# Patient Record
Sex: MALE | Employment: OTHER | URBAN - METROPOLITAN AREA
[De-identification: names, ages, dates, MRNs, and addresses within clinical notes are randomized per-mention and may not be internally consistent; named-entity substitution may affect disease eponyms.]

---

## 2023-03-02 ENCOUNTER — TRANSCRIBE ORDER (OUTPATIENT)
Dept: SCHEDULING | Age: 53
End: 2023-03-02

## 2023-03-02 DIAGNOSIS — Z00.00 PREVENTATIVE HEALTH CARE: Primary | ICD-10-CM

## 2023-03-14 ENCOUNTER — HOSPITAL ENCOUNTER (OUTPATIENT)
Dept: CT IMAGING | Age: 53
Discharge: HOME | End: 2023-03-14
Attending: INTERNAL MEDICINE
Payer: SELF-PAY

## 2023-03-14 DIAGNOSIS — Z00.00 PREVENTATIVE HEALTH CARE: ICD-10-CM

## 2023-03-14 PROCEDURE — 75571 CT HRT W/O DYE W/CA TEST: CPT

## 2024-05-20 ENCOUNTER — OFFICE VISIT (OUTPATIENT)
Age: 54
End: 2024-05-20
Payer: COMMERCIAL

## 2024-05-20 VITALS
HEIGHT: 70 IN | OXYGEN SATURATION: 96 % | SYSTOLIC BLOOD PRESSURE: 132 MMHG | HEART RATE: 97 BPM | WEIGHT: 185 LBS | DIASTOLIC BLOOD PRESSURE: 72 MMHG | BODY MASS INDEX: 26.48 KG/M2

## 2024-05-20 DIAGNOSIS — R25.1 TREMOR OF BOTH HANDS: Primary | ICD-10-CM

## 2024-05-20 PROCEDURE — 99204 OFFICE O/P NEW MOD 45 MIN: CPT | Performed by: PSYCHIATRY & NEUROLOGY

## 2024-05-20 RX ORDER — LISINOPRIL 10 MG/1
10 TABLET ORAL DAILY
COMMUNITY
Start: 2024-05-16

## 2024-05-20 RX ORDER — LEVOTHYROXINE SODIUM 112 UG/1
112 TABLET ORAL DAILY
COMMUNITY
Start: 2024-04-16

## 2024-05-20 RX ORDER — ATORVASTATIN CALCIUM 10 MG/1
10 TABLET, FILM COATED ORAL DAILY
COMMUNITY
Start: 2024-05-16

## 2024-05-20 NOTE — PROGRESS NOTES
NEUROLOGY  NEW PATIENT EVALUATION/CONSULTATION       PATIENT NAME: Franky Worley    MRN: [id number]    REASON FOR CONSULTATION: Tremor    05/20/24      Previous records (physician notes, laboratory reports, and radiology reports) and imaging studies were reviewed and summarized. My recommendations will be communicated back to the patient's physician(s) via electronic medical record and/or by US mail.       HISTORY OF PRESENT ILLNESS:  Franky Worley is a 54 y.o. right handed male presenting for evaluation of tremors which started Fall 2023, primarily noticed by his wife. Around that same time, his Endocrinologist noticed that his thyroid levels were elevated due to excessive thyroid supplementation. His wife has not noted a significant change in tremors with dose reduction. They have also noticed periodic limb movements during sleep.     Motor:   Tremor-b/l hands at rest, not with activity  Walking/Falls-walking is stable  Micrographia-uncertain  Freezing/Bradykinesia-none  Balance-no issues  Non-motor:   Drooling-no  Dysphagia-no  Hypophonia-no  Anosmia-denies  Autonomic:  Urinary-no issues  Constipation-no issues  ED-no issues  Orthostatic hypotension-no issues  Sleep-occasional limb movements during sleep, denies vivid dreams  Cognitive/Memory-none  Behavioral/Psychiatric:   Hallucinations-none  Depression-none    The patient denies a history of exposure to neuroleptics, Reglan, Phenergan, Compazine, no encephalitis/meningitis, no significant exposure to insecticides/ pesticides/ heavy metals/ carbon monoxide.     Father-essential tremor    Previous evaluations: TSH 4.96      PAST MEDICAL HISTORY:  Past Medical History:   Diagnosis Date    Hashimoto's disease     Hypercholesteremia     Hypertension        PAST SURGICAL HISTORY:  History reviewed. No pertinent surgical history.    FAMILY HISTORY:   Family History   Problem Relation Age of Onset    Alzheimer's Disease Mother     Frontotemporal dementia